# Patient Record
Sex: FEMALE | Race: WHITE | RURAL
[De-identification: names, ages, dates, MRNs, and addresses within clinical notes are randomized per-mention and may not be internally consistent; named-entity substitution may affect disease eponyms.]

---

## 2019-12-31 ENCOUNTER — TELEPHONE (OUTPATIENT)
Dept: FAMILY MEDICINE CLINIC | Age: 74
End: 2019-12-31

## 2024-11-12 ENCOUNTER — TRANSCRIBE ORDERS (OUTPATIENT)
Facility: HOSPITAL | Age: 79
End: 2024-11-12

## 2024-11-12 DIAGNOSIS — Z90.2 ACQUIRED ABSENCE OF LUNG: Primary | ICD-10-CM

## 2024-11-12 DIAGNOSIS — R06.02 SHORTNESS OF BREATH: ICD-10-CM

## 2024-11-19 ENCOUNTER — HOSPITAL ENCOUNTER (OUTPATIENT)
Facility: HOSPITAL | Age: 79
Discharge: HOME | End: 2024-11-22
Payer: MEDICARE

## 2024-11-19 DIAGNOSIS — Z90.2 ACQUIRED ABSENCE OF LUNG: ICD-10-CM

## 2024-11-19 DIAGNOSIS — R06.02 SHORTNESS OF BREATH: ICD-10-CM

## 2024-11-19 PROCEDURE — 94727 GAS DIL/WSHOT DETER LNG VOL: CPT

## 2024-11-19 PROCEDURE — 94010 BREATHING CAPACITY TEST: CPT

## 2024-11-21 NOTE — PROCEDURES
Pulmonary Function Test Report          PATIENT ID: Yadira Iniguez  MRN: [id number]   YOB: 1945    DATE OF ADMISSION: [id number]    PRIMARY CARE PROVIDER: Leilani So APRN - NP       Spirometry:  Spirometry shows mild obstructive defect.    Bronchodilator response:  Bronchodilator test not performed.    Volumes:  Lung volumes are consistent with moderate restrictive disease.    Diffusion:  Diffusing capacity is elevated.    Flow-Volume:  The flow-volume loop is compatible with an obstructive process.      Signed:   Gemini Kang MD  11/21/2024  5:58 PM